# Patient Record
Sex: FEMALE | Race: WHITE | NOT HISPANIC OR LATINO | Employment: OTHER | URBAN - METROPOLITAN AREA
[De-identification: names, ages, dates, MRNs, and addresses within clinical notes are randomized per-mention and may not be internally consistent; named-entity substitution may affect disease eponyms.]

---

## 2017-06-28 ENCOUNTER — HISTORICAL (OUTPATIENT)
Dept: BARIATRICS | Facility: HOSPITAL | Age: 52
End: 2017-06-28

## 2017-07-19 ENCOUNTER — HISTORICAL (OUTPATIENT)
Dept: BARIATRICS | Facility: HOSPITAL | Age: 52
End: 2017-07-19

## 2017-08-10 ENCOUNTER — HISTORICAL (OUTPATIENT)
Dept: BARIATRICS | Facility: HOSPITAL | Age: 52
End: 2017-08-10

## 2017-09-25 ENCOUNTER — HISTORICAL (OUTPATIENT)
Dept: BARIATRICS | Facility: HOSPITAL | Age: 52
End: 2017-09-25

## 2017-10-26 ENCOUNTER — HISTORICAL (OUTPATIENT)
Dept: BARIATRICS | Facility: HOSPITAL | Age: 52
End: 2017-10-26

## 2017-11-29 ENCOUNTER — HISTORICAL (OUTPATIENT)
Dept: BARIATRICS | Facility: HOSPITAL | Age: 52
End: 2017-11-29

## 2017-12-28 ENCOUNTER — HISTORICAL (OUTPATIENT)
Dept: BARIATRICS | Facility: HOSPITAL | Age: 52
End: 2017-12-28

## 2017-12-28 ENCOUNTER — HISTORICAL (OUTPATIENT)
Dept: PREADMISSION TESTING | Facility: HOSPITAL | Age: 52
End: 2017-12-28

## 2017-12-28 LAB — H PYLORI AB SER IA-ACNC: NEGATIVE

## 2018-02-20 ENCOUNTER — HISTORICAL (OUTPATIENT)
Dept: BARIATRICS | Facility: HOSPITAL | Age: 53
End: 2018-02-20

## 2018-02-21 ENCOUNTER — HISTORICAL (OUTPATIENT)
Dept: LAB | Facility: HOSPITAL | Age: 53
End: 2018-02-21

## 2018-02-21 LAB
ABS NEUT (OLG): 5.23 X10(3)/MCL (ref 2.1–9.2)
ALBUMIN SERPL-MCNC: 3.3 GM/DL (ref 3.4–5)
ALBUMIN/GLOB SERPL: 0.9 {RATIO}
ALP SERPL-CCNC: 84 UNIT/L (ref 38–126)
ALT SERPL-CCNC: 18 UNIT/L (ref 12–78)
APTT PPP: 33.7 SECOND(S) (ref 24.8–36.9)
AST SERPL-CCNC: 7 UNIT/L (ref 15–37)
BASOPHILS # BLD AUTO: 0 X10(3)/MCL (ref 0–0.2)
BASOPHILS NFR BLD AUTO: 0 %
BILIRUB SERPL-MCNC: 0.4 MG/DL (ref 0.2–1)
BILIRUBIN DIRECT+TOT PNL SERPL-MCNC: 0.1 MG/DL (ref 0–0.2)
BILIRUBIN DIRECT+TOT PNL SERPL-MCNC: 0.3 MG/DL (ref 0–0.8)
BUN SERPL-MCNC: 13 MG/DL (ref 7–18)
CALCIUM SERPL-MCNC: 8.8 MG/DL (ref 8.5–10.1)
CHLORIDE SERPL-SCNC: 105 MMOL/L (ref 98–107)
CO2 SERPL-SCNC: 31 MMOL/L (ref 21–32)
CREAT SERPL-MCNC: 1.2 MG/DL (ref 0.55–1.02)
EOSINOPHIL # BLD AUTO: 0.2 X10(3)/MCL (ref 0–0.9)
EOSINOPHIL NFR BLD AUTO: 3 %
ERYTHROCYTE [DISTWIDTH] IN BLOOD BY AUTOMATED COUNT: 13.9 % (ref 11.5–17)
GLOBULIN SER-MCNC: 3.7 GM/DL (ref 2.4–3.5)
GLUCOSE SERPL-MCNC: 90 MG/DL (ref 74–106)
HCT VFR BLD AUTO: 43.2 % (ref 37–47)
HGB BLD-MCNC: 13.4 GM/DL (ref 12–16)
LYMPHOCYTES # BLD AUTO: 1.8 X10(3)/MCL (ref 0.6–4.6)
LYMPHOCYTES NFR BLD AUTO: 22 %
MCH RBC QN AUTO: 29.8 PG (ref 27–31)
MCHC RBC AUTO-ENTMCNC: 31 GM/DL (ref 33–36)
MCV RBC AUTO: 96 FL (ref 80–94)
MONOCYTES # BLD AUTO: 0.6 X10(3)/MCL (ref 0.1–1.3)
MONOCYTES NFR BLD AUTO: 8 %
NEUTROPHILS # BLD AUTO: 5.23 X10(3)/MCL (ref 1.4–7.9)
NEUTROPHILS NFR BLD AUTO: 66 %
PLATELET # BLD AUTO: 262 X10(3)/MCL (ref 130–400)
PMV BLD AUTO: 11.6 FL (ref 9.4–12.4)
POTASSIUM SERPL-SCNC: 4.3 MMOL/L (ref 3.5–5.1)
PROT SERPL-MCNC: 7 GM/DL (ref 6.4–8.2)
RBC # BLD AUTO: 4.5 X10(6)/MCL (ref 4.2–5.4)
SODIUM SERPL-SCNC: 143 MMOL/L (ref 136–145)
WBC # SPEC AUTO: 7.9 X10(3)/MCL (ref 4.5–11.5)

## 2018-03-07 ENCOUNTER — HISTORICAL (OUTPATIENT)
Dept: BARIATRICS | Facility: HOSPITAL | Age: 53
End: 2018-03-07

## 2018-03-28 ENCOUNTER — HISTORICAL (OUTPATIENT)
Dept: BARIATRICS | Facility: HOSPITAL | Age: 53
End: 2018-03-28

## 2018-05-21 ENCOUNTER — HISTORICAL (OUTPATIENT)
Dept: BARIATRICS | Facility: HOSPITAL | Age: 53
End: 2018-05-21

## 2018-09-12 ENCOUNTER — HISTORICAL (OUTPATIENT)
Dept: BARIATRICS | Facility: HOSPITAL | Age: 53
End: 2018-09-12

## 2019-03-13 ENCOUNTER — HISTORICAL (OUTPATIENT)
Dept: BARIATRICS | Facility: HOSPITAL | Age: 54
End: 2019-03-13

## 2020-07-21 ENCOUNTER — HISTORICAL (OUTPATIENT)
Dept: ADMINISTRATIVE | Facility: HOSPITAL | Age: 55
End: 2020-07-21

## 2020-08-11 ENCOUNTER — HISTORICAL (OUTPATIENT)
Dept: ADMINISTRATIVE | Facility: HOSPITAL | Age: 55
End: 2020-08-11

## 2022-04-14 ENCOUNTER — HISTORICAL (OUTPATIENT)
Dept: ADMINISTRATIVE | Facility: HOSPITAL | Age: 57
End: 2022-04-14

## 2022-04-30 NOTE — PROGRESS NOTES
[facility]  Weight Loss Surgery Department  [address]  [address]  [place], [place]  [phone] Office  [phone] Fax    Pre-Op: Medically Supervised Weight Loss Counseling    Name: Bella Carter   : 1965   MRN: [id number]  Date:  17    Visit #:     Todays Wt: 296#   Previous Wt: 294.7#   Initial Wt: 294.7#        Notes from Dietitian/Nutritionist Visit:  Patient provided food journal as advised to begin at initial nutrition consult. Patient consuming multiple starches daily per recall. Patient also reports decreased portions at meal times, and is no longer returning for second helpings during meal times. Also reports she has d/cd beer consumption. Also reduced sweet tea consumption, however not to one serving weekly.     Reviewed importance of continuing with current dietary changes. Also reviewed importance of measuring portions and reducing starch based foods to one serving per meal daily. Advised patient to begin measuring starches to ½ cup or 1 slice per meal, protein to 3-4 oz per meal, ½ cup  1 cup portion of fruit, and 2 cups non starchy vegetables per meal. Patient verbalized understanding.           Plan:  x Continue above changes  x Follow up in approx 1 month   Pt has completed medically supervised wt loss program x  mths  x Goals:   1. Reduce starch based portions as noted above.  2. Continue food journal  3. Measure portions as noted above    Estimated Daily Needs:  2551-0755 Kcals/d 20-25 kcals/kg goal wt  65-72 g protein/d 1.0-1.1 g/kg goal wt  73 fl oz/d  73 fl oz/d female     100 fl oz/d male        Signature of Registered Dietitian/Nutritionist: Amaris Denton, AKILN, LDN

## 2022-04-30 NOTE — OP NOTE
Patient:   Sheron Vasquez            MRN: [id number]            FIN: [id number]               Age:   55 years     Sex:  Female     :  1965   Associated Diagnoses:   None   Author:   Tomer Hi MD      Preoperative Diagnosis: Cataract Right eye    Postoperative Diagnosis: Cataract Right eye    Procedure: Phacoemulsification with intaocular lens implantations Right eye    Surgeon: Tomer Hi MD    Assistant: Dea Hopper, [facility]    Anestheisa: MAC    Complications: None    The patient was brought into the operating suite, where the patient was correctly identified as was the operative eye via timeout.  The patient was prepped and draped in a sterile ophthalmic fashion.  A lid speculum was placed in the operative eye and the microscope was brought into place.  A 1.0mm paracentesis was then made at (3) o'clock.  The anterior chamber was filled with Endocoat.  A (superior) clear corneal incision was made with a 2.4 mm keratome.  A 6 mm corneal marking ring was used to deny the cornea centered over the visual axis.  A 5.00 mm continuous curvilinear capsulorhexis was fashioned using a cystotome and microcapsular forceps.  Hydrodissection and hydrodelineation was performed with upreserved 1% Xylocaine.  The nucleus was then phacoemulsified with the Abbott phacoemulsification hand-piece with a total of (1) EFX.  The cortex was then removed with the I/A hand-piece. The lens model (ZCB00) with a power of (21.0) was placed in the capsular bag.  The Helon was then removed from the eye with the I/A hand piece.  The anterior chamber was inflated and the wounds were hydrated with BSS.  The wounds were checked with Weck-Nina sponges and found to be watertight.  The lid speculum was removed and topical antibiotics were placed on the operative eye.  The patient was brought to PACU in good condition.

## 2022-04-30 NOTE — PROGRESS NOTES
[facility]  Weight Loss Surgery Department  [address]  [address]  [place], [place]  [phone] Office  [phone] Fax    Pre-Op: Medically Supervised Weight Loss Counseling    Name: Sheron Carter   : 1965   MRN: [id number]  Date:  17    Visit #: 3/6    Todays Wt: 299#   Previous Wt: 296#   Initial Wt: 294.7#        Notes from Dietitian/Nutritionist Visit:  Patient reports she has not accomplished the consumption of breakfast daily despite continues recommendations. Provided additional recommendations such as reminder alarms or signs posted in her home as a reminder. Patient states she has been able to begin measuring starchy CHO. She has not begun measuring protein sources. Patient states she has not been able to exercise. Patient did not have food journal available today.     Reviewed appropriate shopping tips as well as measuring tips. Advised patient to continue to work towards daily breakfast consumption. Also provided example food choices.     Plan:  x Continue above changes  x Follow up in approx 1 month   Pt has completed medically supervised wt loss program x  mths  x Goals:   1. Continue to measure starch based portions as noted above.  2. Continue food journal as previously recommended.  3. Breakfast daily.    Estimated Daily Needs:  5368-7632 Kcals/d 20-25 kcals/kg goal wt  65-72 g protein/d 1.0-1.1 g/kg goal wt  73 fl oz/d  73 fl oz/d female     100 fl oz/d male        Signature of Registered Dietitian/Nutritionist: Amaris Denton, AKILN, LDN

## 2022-04-30 NOTE — OP NOTE
Patient:   Sheron Vasquez            MRN: [id number]            FIN: [id number]               Age:   55 years     Sex:  Female     :  1965   Associated Diagnoses:   None   Author:   Tomer Hi MD      Preoperative Diagnosis: Cataract Left eye    Postoperative Diagnosis: Cataract Left eye    Procedure: Phacoemulsification with intaocular lens implantations Left eye    Surgeon: Tomer Hi MD    Assistant: GEOVANNI Bowers     Anestheisa: MAC    Complications: None    The patient was brought into the operating suite, where the patient was correctly identified as was the operative eye via timeout.  The patient was prepped and draped in a sterile ophthalmic fashion.  A lid speculum was placed in the operative eye and the microscope was brought into place.  A 1.0mm paracentesis was then made at 6 o'clock.  The anterior chamber was filled with Endocoat.  A temporal clear corneal incision was made with a 2.4 mm keratome.  A 6 mm corneal marking ring was used to deny the cornea centered over the visual axis.  A 5.00 mm continuous curvilinear capsulorhexis was fashioned using a cystotome and microcapsular forceps.  Hydrodissection and hydrodelineation was performed with upreserved 1% Xylocaine.  The nucleus was then phacoemulsified with the Abbott phacoemulsification hand-piece with a total of 001 EFX.  The cortex was then removed with the I/A hand-piece. The lens model ZCB00 with a power of 21.5 was placed in the capsular bag.  The Helon was then removed from the eye with the I/A hand piece.  The anterior chamber was inflated and the wounds were hydrated with BSS.  The wounds were checked with Weck-Nina sponges and found to be watertight.  The lid speculum was removed and topical antibiotics were placed on the operative eye.  The patient was brought to PACU in good condition.        2020 @ [facility]

## 2022-04-30 NOTE — PROGRESS NOTES
[facility]  Weight Loss Surgery Department  [address]  [address]  [place], [place]  [phone] Office  [phone] Fax    Pre-Op: Medically Supervised Weight Loss Counseling    Name: Sheron Carter : 1965   MRN: [id number]  Date:  17  Visit #:     Todays Wt: 310.7#  Previous Wt: 302#   Initial Wt: 294.7#        Notes from Dietitian/Nutritionist Visit:  Pt states she overindulged during the Edy holidays which has contributed to wt gain.  Pt states she is returning to avoidance of starchy, processed CHOs, and focusing on choosing more protein + vegetable based meals.  Discussed need to continue this in light of upcoming pre-op diet.  Briefly educated pt on the basis of the pre-op diet.  Pt to receive full education 3 weeks prior to scheduled surgery.  Pt voiced understanding.    Plan:  x Continue above changes  x Follow up in approx 1 month   Pt has completed medically supervised wt loss program x  mths  x Goals:   1. Continue food journal as previously recommended.  2. Breakfast daily within 2 hours of waking.  3. Incorporation of routine afternoon mini meal utilizing suggested options.    Estimated Daily Needs:  4274-4051 Kcals/d 20-25 kcals/kg goal wt  65-72 g protein/d 1.0-1.1 g/kg goal wt  73 fl oz/d  73 fl oz/d female     100 fl oz/d male        Signature of Registered Dietitian/Nutritionist: Linda Atkins, AKILN, LDN

## 2022-04-30 NOTE — PROGRESS NOTES
[facility]  Weight Loss Surgery Department  [address]  [place], [place]  [phone] Office  [phone] Fax    Pre-Op: Medically Supervised Weight Loss Counseling    Name: Sheron Carter   : 1965   MRN: [id number]  Date:  10/26/17    Visit #:     Todays Wt: 303.6#    Previous Wt: 299#   Initial Wt: 294.7#        Notes from Dietitian/Nutritionist Visit:  Pt states she has been able to routinely incorporate breakfast, but struggles with eating it too early.  Pt also states that if she waits to eat breakfast, she will only have an afternoon snack versus a true lunch. Pt reports evening meals comprise mainly of a protein and vegetable source.  Pt denies routinely have a starchy CHO at this meal.  Pt states she only drinks water and is a slow eater.      Discussed the need to have a set midday/early afternoon mini meal rather than snacking to avoid overeating.  Offered pt suggestions for options at this time.  In addition, advised pt to eat within 2 hours of waking in the morning.  Pt voiced understanding.     Plan:  x Continue above changes  x Follow up in approx 1 month   Pt has completed medically supervised wt loss program x  mths  x Goals:   1. Continue to measure starch based portions as noted above.  2. Continue food journal as previously recommended.  3. Breakfast daily within 2 hours of waking.  4. Incorporation of routine afternoon mini meal utilizing suggested options.    Estimated Daily Needs:  4625-2194 Kcals/d 20-25 kcals/kg goal wt  65-72 g protein/d 1.0-1.1 g/kg goal wt  73 fl oz/d  73 fl oz/d female     100 fl oz/d male        Signature of Registered Dietitian/Nutritionist: Linda Atkins, AKILN, LDN

## 2022-04-30 NOTE — PROGRESS NOTES
[facility]  Weight Loss Surgery Department  [address]  [address]  [place], [place]  [phone] Office  [phone] Fax    Pre-Op: Medically Supervised Weight Loss Counseling    Name: Bella Carter   : 1965   MRN: [id number]  Date:  8/10/17    Visit #:     Todays Wt: 296#   Previous Wt: 296#   Initial Wt: 294.7#        Notes from Dietitian/Nutritionist Visit:  Patient reports measuring foods to be difficult, especially when she goes out to eat. She has however reduced her portions by only serving one helping verses two at meal times. Reports satiety with meals even with decreased servings. Patient voiced c/o swelling in extremities. Reviewed low Na seasonings and provided suggestions.   Patient reports approx. 6 bottles of water daily per recall. States she will at time skip breakfast. Patient forgot to bring her food journal in for todays appointment.     Reviewed importance of routine meal times and importance of breakfast daily. Provided suggestions. Advised to begin measuring and continue reduction of portions. Also recommended measure all starchy CHO to ½ cup or 1 slice per meals. Patient verbalized understanding.       Plan:  x Continue above changes  x Follow up in approx 1 month   Pt has completed medically supervised wt loss program x  [facility]  x Goals:   1. Reduce starch based portions as noted above.  2. Continue food journal  3. Measure portions as noted above  4. Breakfast daily.    Estimated Daily Needs:  8644-5046 Kcals/d 20-25 kcals/kg goal wt  65-72 g protein/d 1.0-1.1 g/kg goal wt  73 fl oz/d  73 fl oz/d female     100 fl oz/d male        Signature of Registered Dietitian/Nutritionist: Amaris Denton, AKILN, LDN

## 2022-04-30 NOTE — PROGRESS NOTES
[place]  Weight Loss Surgery Department  [address]  [address]  [place], [place]  [phone] Office  [phone] Fax    Pre-Op: Medically Supervised Weight Loss Counseling    Name: Sheron Crater : 1965   MRN: [id number]  Date:  17  Visit #: 5/6    Todays Wt: 302#  Previous Wt: 303.6#   Initial Wt: 294.7#        Notes from Dietitian/Nutritionist Visit:  Pt states she has eliminated starchy CHOs from all 3 meals and 24 hr recall indicates this. Pt routinely eating breakfast of eggs. Pt reports snacking on fruit in between meals.  Pt also drinking 4-5 bottles of water each day.  Pt states she is eating slowly to get accustomed to behavior changes.    Discussed variety of vitamins and minerals that pt will be required to purchase in preparation for surgery.  In addition, post-op dietary transitions were discussed.  Pt to receive full pre-op education approx. 3 weeks prior to surgery per protocols.  Pt voiced understanding.    Plan:  x Continue above changes  x Follow up in approx 1 month   Pt has completed medically supervised wt loss program x  mths  x Goals:   1. Continue food journal as previously recommended.  2. Breakfast daily within 2 hours of waking.  3. Incorporation of routine afternoon mini meal utilizing suggested options.    Estimated Daily Needs:  9135-8827 Kcals/d 20-25 kcals/kg goal wt  65-72 g protein/d 1.0-1.1 g/kg goal wt  73 fl oz/d  73 fl oz/d female     100 fl oz/d male        Signature of Registered Dietitian/Nutritionist: Linda Atkins, RABIA, LDN

## 2023-05-05 DIAGNOSIS — M54.51 VERTEBROGENIC LOW BACK PAIN: Primary | ICD-10-CM

## 2023-05-05 DIAGNOSIS — M47.26 OTHER SPONDYLOSIS WITH RADICULOPATHY, LUMBAR REGION: ICD-10-CM

## 2023-05-08 ENCOUNTER — HOSPITAL ENCOUNTER (OUTPATIENT)
Dept: RADIOLOGY | Facility: HOSPITAL | Age: 58
Discharge: HOME | End: 2023-05-08
Attending: ORTHOPAEDIC SURGERY
Payer: MEDICARE

## 2023-05-08 DIAGNOSIS — M54.51 VERTEBROGENIC LOW BACK PAIN: ICD-10-CM

## 2023-05-08 DIAGNOSIS — M47.26 OTHER SPONDYLOSIS WITH RADICULOPATHY, LUMBAR REGION: ICD-10-CM

## 2023-05-08 PROCEDURE — 72148 MRI LUMBAR SPINE W/O DYE: CPT | Mod: TC

## 2024-09-17 DIAGNOSIS — M47.816 SPONDYLOSIS WITHOUT MYELOPATHY OR RADICULOPATHY, LUMBAR REGION: Primary | ICD-10-CM

## 2024-09-17 DIAGNOSIS — M54.6 PAIN IN THORACIC SPINE: ICD-10-CM

## 2024-09-25 ENCOUNTER — HOSPITAL ENCOUNTER (OUTPATIENT)
Dept: RADIOLOGY | Facility: HOSPITAL | Age: 59
Discharge: HOME | End: 2024-09-25
Attending: ORTHOPAEDIC SURGERY
Payer: MEDICARE

## 2024-09-25 DIAGNOSIS — M54.6 PAIN IN THORACIC SPINE: ICD-10-CM

## 2024-09-25 DIAGNOSIS — M47.816 SPONDYLOSIS WITHOUT MYELOPATHY OR RADICULOPATHY, LUMBAR REGION: ICD-10-CM

## 2024-09-25 PROCEDURE — 72146 MRI CHEST SPINE W/O DYE: CPT | Mod: TC

## 2024-09-25 PROCEDURE — 72131 CT LUMBAR SPINE W/O DYE: CPT | Mod: TC

## 2024-09-25 PROCEDURE — 72128 CT CHEST SPINE W/O DYE: CPT | Mod: TC
